# Patient Record
Sex: FEMALE | Race: OTHER | Employment: FULL TIME | URBAN - NONMETROPOLITAN AREA
[De-identification: names, ages, dates, MRNs, and addresses within clinical notes are randomized per-mention and may not be internally consistent; named-entity substitution may affect disease eponyms.]

---

## 2023-12-19 ENCOUNTER — OFFICE VISIT (OUTPATIENT)
Dept: URGENT CARE | Facility: CLINIC | Age: 48
End: 2023-12-19
Payer: COMMERCIAL

## 2023-12-19 VITALS
HEART RATE: 79 BPM | SYSTOLIC BLOOD PRESSURE: 138 MMHG | RESPIRATION RATE: 19 BRPM | DIASTOLIC BLOOD PRESSURE: 68 MMHG | TEMPERATURE: 97.8 F | OXYGEN SATURATION: 98 %

## 2023-12-19 DIAGNOSIS — R05.1 ACUTE COUGH: Primary | ICD-10-CM

## 2023-12-19 DIAGNOSIS — R50.9 FEVER, UNSPECIFIED FEVER CAUSE: ICD-10-CM

## 2023-12-19 DIAGNOSIS — R11.2 NAUSEA AND VOMITING, UNSPECIFIED VOMITING TYPE: ICD-10-CM

## 2023-12-19 DIAGNOSIS — J06.9 ACUTE URI: ICD-10-CM

## 2023-12-19 PROCEDURE — 99203 OFFICE O/P NEW LOW 30 MIN: CPT

## 2023-12-19 RX ORDER — AZITHROMYCIN 250 MG/1
TABLET, FILM COATED ORAL
Qty: 6 TABLET | Refills: 0 | Status: SHIPPED | OUTPATIENT
Start: 2023-12-19 | End: 2023-12-23

## 2023-12-19 RX ORDER — ONDANSETRON 4 MG/1
4 TABLET, FILM COATED ORAL EVERY 8 HOURS PRN
Qty: 20 TABLET | Refills: 0 | Status: SHIPPED | OUTPATIENT
Start: 2023-12-19

## 2023-12-19 RX ORDER — BENZONATATE 200 MG/1
200 CAPSULE ORAL 3 TIMES DAILY PRN
Qty: 20 CAPSULE | Refills: 0 | Status: SHIPPED | OUTPATIENT
Start: 2023-12-19

## 2023-12-19 NOTE — PROGRESS NOTES
[facility]        NAME: Giulia Bender is a 48 y.o. female  : 1975    MRN: [id number]  DATE: 2023  TIME: 12:42 PM    Assessment and Plan   Acute cough [R05.1]  1. Acute cough  benzonatate (TESSALON) 200 MG capsule    azithromycin (ZITHROMAX) 250 mg tablet      2. Acute URI  azithromycin (ZITHROMAX) 250 mg tablet      3. Fever, unspecified fever cause        4. Nausea and vomiting, unspecified vomiting type  ondansetron (ZOFRAN) 4 mg tablet        7 days of cough, upper respiratory congestion, on and off fever, and nausea/vomiting.  No sick contacts.  Will start on azithromycin and Tessalon Perles.  Also sending in Zofran to help with nausea and vomiting.  Given return to work note.  Advised to follow-up with family doctor.  Advised to go to the ER if any symptoms worsen.    Patient Instructions     Take prescribed medication as instructed.  Tylenol or ibuprofen for pain or fever.  Make sure to stay well-hydrated and rest.  Simple diet of bananas, rice, applesauce, toast, crackers until normal appetite is gradually tolerated.  May try nasal saline rinses for upper respiratory congestion twice daily.  If no improvement, follow-up with family doctor.  Advised to go to the ER if symptoms worsen.  Follow up with PCP in 3-5 days.  Proceed to  ER if symptoms worsen.    Chief Complaint     Chief Complaint   Patient presents with    Cough    Vomiting    Nasal Congestion    Fever    Earache     Started 7 days ago  OTC cold medication  Request note for work         History of Present Illness       48-year-old female presents to the clinic with 1 week of cough, congestion, nasal drainage, nausea, vomiting, fever.  Denies sick contacts.  Took some over-the-counter cold and flu medications such as TheraFlu with minimal improvement.  She denies any chills, chest pain, shortness of breath, sore throat, abdominal pain, diarrhea, constipation.    Cough  Associated symptoms include ear pain, a  fever and rhinorrhea. Pertinent negatives include no chills, sore throat, shortness of breath or wheezing.   Vomiting   Associated symptoms include coughing and a fever. Pertinent negatives include no abdominal pain, chills or diarrhea.   Fever  Associated symptoms include congestion, coughing, a fever, nausea and vomiting. Pertinent negatives include no abdominal pain, chills or sore throat.   Earache   Associated symptoms include coughing, rhinorrhea and vomiting. Pertinent negatives include no abdominal pain, diarrhea or sore throat.       Review of Systems   Review of Systems   Constitutional:  Positive for appetite change and fever. Negative for chills.   HENT:  Positive for congestion, ear pain and rhinorrhea. Negative for sinus pressure and sore throat.    Eyes: Negative.    Respiratory:  Positive for cough. Negative for shortness of breath and wheezing.    Cardiovascular: Negative.    Gastrointestinal:  Positive for nausea and vomiting. Negative for abdominal distention, abdominal pain, constipation and diarrhea.   Musculoskeletal: Negative.    Skin: Negative.    Neurological: Negative.          Current Medications       Current Outpatient Medications:     azithromycin (ZITHROMAX) 250 mg tablet, Take 2 tablets today then 1 tablet daily x 4 days, Disp: 6 tablet, Rfl: 0    benzonatate (TESSALON) 200 MG capsule, Take 1 capsule (200 mg total) by mouth 3 (three) times a day as needed for cough, Disp: 20 capsule, Rfl: 0    ondansetron (ZOFRAN) 4 mg tablet, Take 1 tablet (4 mg total) by mouth every 8 (eight) hours as needed for nausea or vomiting, Disp: 20 tablet, Rfl: 0    Current Allergies     Allergies as of 12/19/2023 - Reviewed 12/19/2023   Allergen Reaction Noted    Penicillin g Other (See Comments) 09/24/2015            The following portions of the patient's history were reviewed and updated as appropriate: allergies, current medications, past family history, past medical history, past social history, past  surgical history and problem list.     No past medical history on file.    No past surgical history on file.    No family history on file.      Medications have been verified.        Objective   /68   Pulse 79   Temp 97.8 °F (36.6 °C)   Resp 19   SpO2 98%        Physical Exam     Physical Exam  Constitutional:       General: She is not in acute distress.     Appearance: Normal appearance. She is not ill-appearing, toxic-appearing or diaphoretic.   HENT:      Head: Normocephalic and atraumatic.      Right Ear: Tympanic membrane, ear canal and external ear normal.      Left Ear: Tympanic membrane, ear canal and external ear normal.      Nose: Congestion and rhinorrhea present.      Mouth/Throat:      Mouth: Mucous membranes are moist.      Pharynx: Oropharynx is clear. No oropharyngeal exudate or posterior oropharyngeal erythema.   Eyes:      Extraocular Movements: Extraocular movements intact.      Conjunctiva/sclera: Conjunctivae normal.      Pupils: Pupils are equal, round, and reactive to light.   Cardiovascular:      Rate and Rhythm: Normal rate and regular rhythm.      Pulses: Normal pulses.      Heart sounds: Normal heart sounds.   Pulmonary:      Effort: Pulmonary effort is normal. No tachypnea, bradypnea, accessory muscle usage, prolonged expiration, respiratory distress or retractions.      Breath sounds: Normal breath sounds and air entry. No stridor, decreased air movement or transmitted upper airway sounds. No decreased breath sounds, wheezing, rhonchi or rales.      Comments: Harsh sounding repetitive cough on exam  Chest:      Chest wall: No tenderness.   Abdominal:      General: Abdomen is flat. Bowel sounds are normal. There is no distension.      Palpations: Abdomen is soft. There is no mass.      Tenderness: There is no abdominal tenderness. There is no right CVA tenderness, left CVA tenderness, guarding or rebound.   Lymphadenopathy:      Cervical: No cervical adenopathy.   Skin:      General: Skin is warm and dry.      Capillary Refill: Capillary refill takes less than 2 seconds.      Findings: No rash.   Neurological:      General: No focal deficit present.      Mental Status: She is alert and oriented to person, place, and time. Mental status is at baseline.   Psychiatric:         Mood and Affect: Mood normal.         Behavior: Behavior normal.

## 2023-12-19 NOTE — LETTER
December 19, 2023     Patient: Giulia Bender   YOB: 1975   Date of Visit: 12/19/2023       To Whom it May Concern:    Giulia Bender was seen in my clinic on 12/19/2023. She may return to work once symptoms have improved and fever free for 24 hours.    If you have any questions or concerns, please don't hesitate to call.         Sincerely,          Jesus Mancera PA-C        CC: No Recipients

## 2023-12-19 NOTE — PATIENT INSTRUCTIONS
Take prescribed medication as instructed.  Tylenol or ibuprofen for pain or fever.  Make sure to stay well-hydrated and rest.  Simple diet of bananas, rice, applesauce, toast, crackers until normal appetite is gradually tolerated.  May try nasal saline rinses for upper respiratory congestion twice daily.  If no improvement, follow-up with family doctor.  Advised to go to the ER if symptoms worsen.  Follow up with PCP in 3-5 days.  Proceed to  ER if symptoms worsen.  Fiebre en adultos   LO QUE NECESITA SABER:   La fiebre es un aumento en la temperatura corporal. La temperatura normal del cuerpo es de 98.6°F (37°C). La temperatura se considera danielle fiebre cuando alcanza más 100.4°F (38°C). Las causas frecuentes incluyen danielle infección, danielle lesión o danielle enfermedad, sacha la artritis.  INSTRUCCIONES SOBRE EL [facility]:   Regrese a la garfield de emergencias si:  Doyle fiebre no desaparece, o empeora aún después del tratamiento.    Usted tiene el es rígido y mucho dolor de jace.    Usted está confundido. Es probable que no pueda pensar claramente o recordar cosas de la manera habitual.    Doyle corazón late más rápido de lo normal incluso después del tratamiento.    Usted tiene falta de aliento o tiene dolor de pecho cuando respira.    Usted orina pequeñas cantidades o no orina nada en absoluto.    Doyle piel, labios o uñas se ponen azules.    Comuníquese con doyle médico si:  Usted tiene dolor en el abdomen o siente doyle estómago inflamado.    Tiene náuseas o está vomitando.    Siente dolor o ardor cuando orina o tiene dolor en la espalda.    Usted tiene preguntas o inquietudes acerca de doyle condición o cuidado.    Medicamentos: Es posible que usted necesite alguno de los siguientes:  MICHELLE sacha el ibuprofeno, ayudan a disminuir la inflamación, el dolor y la fiebre. Margo medicamento está disponible con o sin danielle receta médica. Los MICHELLE pueden causar sangrado estomacal o problemas renales en ciertas personas. Si usted está  tomando un anticoagulante,  siempre  pregunte si los MICHELLE son seguros para usted. Siempre lexie la etiqueta de odalys medicamento y siga las instrucciones. No administre odalys medicamento a niños menores de 6 meses de mirian sin antes obtener la autorización del médico.     Acetaminofén elena el dolor y baja la fiebre. Está disponible sin receta médica. Pregunte la cantidad y la frecuencia con que debe tomarlos. Siga las indicaciones. Lexie las etiquetas de todos los demás medicamentos que esté usando para saber si también contienen acetaminofén, o pregunte a doyle médico o farmacéutico. El acetaminofén puede causar daño en el hígado cuando no se benja de forma correcta.    Los antibióticos se puede administrar si tiene danielle infección a causa de alguna bacteria.    [place] indio medicamentos sacha se le haya indicado. Consulte con doyle médico si usted andrea que doyle medicamento no le está ayudando o si presenta efectos secundarios. Infórmele al médico si usted es alérgico a algún medicamento. Mantenga danielle lista actualizada de los medicamentos, las vitaminas y los productos herbales que benja. Incluya los siguientes datos de los medicamentos: cantidad, frecuencia y motivo de administración. Traiga con usted la lista o los envases de las píldoras a indio citas de seguimiento. Lleve la lista de los medicamentos con usted en shellie de danielle emergencia.    Acuda a indio consultas de control con doyle médico según le indicaron: Anote indio preguntas para que se acuerde de hacerlas frederick indio visitas.  Cuidados personales:  [place]cow más líquidos sacha se le indique. La fiebre lo hace sudar. Salvo puede aumentar doyle riesgo de presentar deshidratación. Los líquidos pueden ayudar a evitar la deshidratación.     Alejandra por lo menos de 6 a 8 vasos de ocho onzas de líquidos florentino cada día. Alejandra agua, jugo o caldo. No alejandra bebidas deportivas. Pueden contener cafeína.    Es posible que necesite danielle solución de sales de rehidratación oral (SRO o dandre oral). Danielle SRO tiene las  cantidades exactas de agua, sal y azúcar que usted necesita para reemplazar los líquidos corporales.    Vístase con ropa ligera y fresca. Los temblores podrían ser danielle señal de que la fiebre está aumentando. No ponga más cobijas encima de usted ni se abrigue más. Rondo podría provocar que le suba la fiebre aún más. Vístase con ropa liviana y cómoda. Utilice danielle manta liviana o danielle sábana al dormir. Cambie doyle ropa, las cobijas o las sábanas si se mojan.    Refrésquese de manera sena. Báñese con agua tibia o fresca. Use danielle bolsa de hielo envuelta en danielle toalla pequeña o moje un paño con agua fría. Coloque la compresa de hielo o pañuelo húmedo sobre doyle frente o en la parte posterior del es.    © Copyright [employer] 2023 Information is for End User's use only and may not be sold, redistributed or otherwise used for commercial purposes.  Esta información es sólo para uso en educación. Doyle intención no es darle un consejo médico sobre enfermedades o tratamientos. Colsulte con doyle médico, enfermera o farmacéutico antes de seguir cualquier régimen médico para saber si es seguro y efectivo para usted.  Tos aguda   LO QUE NECESITA SABER:   Danielle tos aguda puede durar hasta 3 semanas. Las causas comunes de danielle tos aguda incluyen un resfriado, alergias o danielle infección pulmonar.  INSTRUCCIONES SOBRE [facility]:   Regrese a la garfield de emergencias si:  Tiene dificultad para respirar o le falta el aire.    Usted tose genesis o nota genesis en doyle mucosidad.    Se desmaya, se siente débil o mareado.    Le duele el pecho cuando respira hondo o tose.    Tiene respiración silbante.    Comuníquese con doyle médico si:  Tiene fiebre.    La tos dura más de 4 semanas.    Valeria síntomas no mejoran con el tratamiento.    Usted tiene preguntas o inquietudes acerca de doyle condición o cuidado.    Medicamentos:  Los medicamentos para detener la tos, disminuir la inflamación de las vías respiratorias o ayudar a abrirlas. Los medicamentos  también pueden despejar las vías respiratorias. Pregunte a doyle médico qué medicamentos de venta dai puede yesenia. Si tiene danielle infección causada por bacterias, es posible que necesite antibióticos.    [place] indio medicamentos sacha se le haya indicado. Consulte con doyle médico si usted andrea que doyle medicamento no le está ayudando o si presenta efectos secundarios. Infórmele al médico si usted es alérgico a algún medicamento. Mantenga danielle lista actualizada de los medicamentos, las vitaminas y los productos herbales que benja. Incluya los siguientes datos de los medicamentos: cantidad, frecuencia y motivo de administración. Traiga con usted la lista o los envases de las píldoras a indio citas de seguimiento. Lleve la lista de los medicamentos con usted en shellie de danielle emergencia.    El manejo de indio síntomas:  No fume y permanezca lejos de otras personas que fuman. La nicotina y otros químicos contenidos en los cigarrillos y puros pueden causar daño pulmonar y empeorar doyle tos. Pida información a doyle médico si usted actualmente fuma y necesita ayuda para dejar de fumar. Los cigarrillos electrónicos o el tabaco sin humo igualmente contienen nicotina. Consulte con doyle médico antes de utilizar estos productos.    [place] líquidos adicionales sacha se le indique. Los líquidos le ayudarán a aflojar y disolver la mucosidad para que la pueda expectorar. Los líquidos ayudarán a evitar la deshidratación. Los mejores líquidos para yesenia son el agua, el jugo y el caldo. No tome líquidos que contienen cafeína. La cafeína puede aumentar el riesgo de deshidratación. Pregúntele a doyle médico cuál es la cantidad de líquido que necesita ingerir a diario.    Repose según le indicaron. No realice actividades que empeoren la tos, sacha el ejercicio físico.    Use un humidificador o vaporizador. Use un humidificador de daksha frío o un vaporizador para elevar la humedad en doyle casa. [place] podría ayudarle a respirar más fácilmente y al mismo tiempo disminuir la  tos.    Ingiera de 2 a 5 ml de miel dos veces al día. La miel puede ayudar a diluir los mocos y disminuir la tos.    Utilice gotas o pastillas para la tos. Estas pueden ayudar a disminuir la irritación de la garganta y la tos.    Acuda a valeria consultas de control con doyle médico según le indicaron: Anote valeria preguntas para que se acuerde de hacerlas frederick valeria visitas.  © Copyright Merative 2023 Information is for End User's use only and may not be sold, redistributed or otherwise used for commercial purposes.  Esta información es sólo para uso en educación. Doyle intención no es darle un consejo médico sobre enfermedades o tratamientos. Colsulte con doyle médico, enfermera o farmacéutico antes de seguir cualquier régimen médico para saber si es seguro y efectivo para usted.  Náuseas y vómitos agudos   LO QUE NECESITA SABER:   Leighann significa que las náuseas y los vómitos comienzan de forma repentina, empeoran rápidamente y barreto un período breve de tiempo. Existen muchas causas posibles de náuseas y vómitos agudos.  INSTRUCCIONES SOBRE [facility]:   Llame al número de emergencias local (911 en [place]) si:  Usted tiene dolor en el pecho.    Usted tiene dolor intenso o calambres en el abdomen.    Usted tiene visión borrosa.    Usted está confundido, tiene fiebre guanakito o rigidez de es.    A usted le sale genesis luis brillante del recto.    El vómito huele a evacuación intestinal.    Regrese a la garfield de emergencias si:  Usted tiene dolor de jace intenso.    Usted está mareado, tiene frío, sed y sequedad en los ojos y la boca.    Usted está orinando muy poco o nada en absoluto.    Usted tiene mareos o desvanecimiento al ponerse de pie.    Usted ve genesis o un material que parece café molido en el vómito.    Llame a doyle médico si:  Usted continúa vomitando por más de 48 horas.    Valeria náusea y vómitos no mejoran ni desaparecen después de usar el medicamento.    Usted tiene preguntas o inquietudes acerca  de doyle condición o cuidado.    Medicamentos: Es posible que usted necesite alguno de los siguientes:  Los medicamentos se puede administrar para calmarle el estómago y detener indio vómitos. También puede necesitar medicamentos para ayudar a vaciar el estómago y los intestinos.    [place] indio medicamentos sacha se le haya indicado. Consulte con doyle médico si usted andrea que doyle medicamento no le está ayudando o si presenta efectos secundarios. Infórmele al médico si usted es alérgico a algún medicamento. Mantenga danielle lista actualizada de los medicamentos, las vitaminas y los productos herbales que benja. Incluya los siguientes datos de los medicamentos: cantidad, frecuencia y motivo de administración. Traiga con usted la lista o los envases de las píldoras a indio citas de seguimiento. Lleve la lista de los medicamentos con usted en shellie de danielle emergencia.    El manejo de indio síntomas:  Fela reposo tanto sacha pueda. Demasiada actividad puede empeorar las náuseas.    [place] más líquidos para evitar la deshidratación. [place] pequeños sorbos. Pruebe bebidas sacha el ginger ale, la limonada, el agua o el té. Doyle médico podría recomendarle que tome danielle solución de rehidratación oral (SRO). Las soluciones de rehidratación oral contienen la cantidad adecuada de agua, sales y azúcar que necesita para restituir los líquidos que perdió doyle organismo.    Ingiera comidas más pequeñas, más a menudo. Pruebe alimentos blandos y evite las especias o los sabores laine    No consuma alcohol. El alcohol podría causarle malestar o irritación estomacal.    Acuda a la consulta de control con doyle médico según las indicaciones: Anote indio preguntas para que se acuerde de hacerlas frederick indio visitas.  © Copyright Merative 2023 Information is for End User's use only and may not be sold, redistributed or otherwise used for commercial purposes.  Esta información es sólo para uso en educación. Doyle intención no es darle un consejo médico sobre enfermedades o  tratamientos. Colsulte con doyel médico, enfermera o farmacéutico antes de seguir cualquier régimen médico para saber si es seguro y efectivo para usted.